# Patient Record
(demographics unavailable — no encounter records)

---

## 2024-12-06 NOTE — REASON FOR VISIT
[Consultation] : a consultation visit [Patient] : patient [Mother] : mother [Procedure: _________] : a [unfilled] procedure visit

## 2024-12-07 NOTE — HISTORY OF PRESENT ILLNESS
[Premenarchal] : premenarchal [FreeTextEntry2] : 9 year 11 months old female who present for f/u of early puberty  Mom noted that at her well visit in May 2024 (9 years 5 months) --> concern for pubic hair and breast development (reports no concerns were brought up to her attention at prior visits) prompting referral to [facility] endocrine  [place] 08/2024  (initial visit)   Review of Initial Presentation at 9 years 7 months  Apocrine odor noted around 10 y/o  Pubic hair in the past 1 year but note sure exactly when started. Unsure about when breast development started - mom states was pointed out to her by PMD at 9 years 5 months but unsure if it was present before  Denied acne  Denied voice changes  Denied vaginal discharge  Denied rapid linear growth No known exogenous exposure to estrogen or testosterone  Does not use Lavender or Tea Tree oil at this time  (used to use Dr. Fonseca's shampoo a few years ago with tea tree oil but has not used it in years) Does not eat soy based products/drink soy milk   Since last visit -Mom noticing a little more pubic hair but no rapid progression  -No significant changes in breast tissue -No vaginal discharge or vaginal spotting/bleeding --Grew 1.5 cm in the past 4 months ---> steady growth along the 40th percentile -Obtained Bone age - similar to chronological age (not advanced ) - see results -Labs ordered but many labs were not ran by lab- see results   Other issues:  Asthma - albuterol PRN  Auditory processing disorder and unilateral decreased hearing (unsure what ear)   Reads slowly - gets speech, PT,   Mother has PCOS; No known FH of CAH

## 2024-12-07 NOTE — ASSESSMENT
[FreeTextEntry1] : Yashira is 9 year 11 months old female who is referred for evaluation of precocious puberty Patient has both breast development and pubic hair as well as axilary hair.   Mom cannot recall exactly when pubic and axillary hair started but believes after 10 y/o  She is unsure about timing of onset of breast development  Review of growth chart from PMD shows linear growth without accelerations and currently she is not growing rapidly.   She is not yet menarchal with no rapid progression of breasts of pubic hair.  In addition, her Bone age is not advanced..  I discussed with mom that given this data, it is unlikely that Yashira's puberty and adrenarche started before 7 y/o (precocious) and likely she has normal timing of puberty and adrenarche.   For completion, however, I did advise early AM BW at initial visit --> patient has normal testo and DHEAS but androstenedione and 17 OHP were not ran.  Estradiol did not run as well.    -For completion advised to obtain above listed labs 7-8 AM  -RTC in 4 months to re-evaluate growth and pubertal progression

## 2024-12-07 NOTE — CONSULT LETTER
[Dear  ___] : Dear  [unfilled], [Consult Letter:] : I had the pleasure of evaluating your patient, [unfilled]. [( Thank you for referring [unfilled] for consultation for _____ )] : Thank you for referring [unfilled] for consultation for [unfilled] [Please see my note below.] : Please see my note below. [Consult Closing:] : Thank you very much for allowing me to participate in the care of this patient.  If you have any questions, please do not hesitate to contact me. [Courtesy Letter:] : I had the pleasure of seeing your patient, [unfilled], in my office today. [FreeTextEntry3] : Sandrine Barnhart MD Pediatric Endocrinology [facility]

## 2024-12-07 NOTE — PHYSICAL EXAM
[Healthy Appearing] : healthy appearing [Well Nourished] : well nourished [Interactive] : interactive [Normal Appearance] : normal appearance [Well formed] : well formed [Normal S1 and S2] : normal S1 and S2 [Clear to Ausculation Bilaterally] : clear to auscultation bilaterally [Abdomen Soft] : soft [Abdomen Tenderness] : non-tender [] : no hepatosplenomegaly [Normal] : normal  [Dysmorphic] : non-dysmorphic [Goiter] : no goiter [Murmur] : no murmurs [de-identified] : wide spaced  teeth  [de-identified] : Breasts- Ortiz 3, PH Ortiz 2, Axillary hair -  mild

## 2024-12-07 NOTE — FAMILY HISTORY
[___ inches] : [unfilled] inches [FreeTextEntry3] : +/-2 SDS [FreeTextEntry5] : 14 y/o [FreeTextEntry4] : MGM: 63'', MGF 64'', does not know height of paternal grandparents  [FreeTextEntry2] : Has 2 sibligns: 19 y/o brother- 69'', 17 y/o brother -63'', Menarche: 14 y/o

## 2024-12-07 NOTE — PHYSICAL EXAM
[Healthy Appearing] : healthy appearing [Well Nourished] : well nourished [Interactive] : interactive [Normal Appearance] : normal appearance [Well formed] : well formed [Normal S1 and S2] : normal S1 and S2 [Clear to Ausculation Bilaterally] : clear to auscultation bilaterally [Abdomen Soft] : soft [Abdomen Tenderness] : non-tender [] : no hepatosplenomegaly [Normal] : normal  [Dysmorphic] : non-dysmorphic [Goiter] : no goiter [Murmur] : no murmurs [de-identified] : wide spaced  teeth  [de-identified] : Breasts- Ortiz 3, PH Ortiz 2, Axillary hair -  mild

## 2024-12-07 NOTE — FAMILY HISTORY
[___ inches] : [unfilled] inches [FreeTextEntry3] : +/-2 SDS [FreeTextEntry5] : 14 y/o [FreeTextEntry4] : MGM: 63'', MGF 64'', does not know height of paternal grandparents  [FreeTextEntry2] : Has 2 sibligns: 19 y/o brother- 69'', 17 y/o brother -63'', Menarche: 12 y/o

## 2024-12-07 NOTE — FAMILY HISTORY
[___ inches] : [unfilled] inches [FreeTextEntry3] : +/-2 SDS [FreeTextEntry5] : 12 y/o [FreeTextEntry4] : MGM: 63'', MGF 64'', does not know height of paternal grandparents  [FreeTextEntry2] : Has 2 sibligns: 17 y/o brother- 69'', 15 y/o brother -63'', Menarche: 12 y/o

## 2024-12-07 NOTE — PHYSICAL EXAM
[Healthy Appearing] : healthy appearing [Well Nourished] : well nourished [Interactive] : interactive [Normal Appearance] : normal appearance [Well formed] : well formed [Normal S1 and S2] : normal S1 and S2 [Clear to Ausculation Bilaterally] : clear to auscultation bilaterally [Abdomen Soft] : soft [Abdomen Tenderness] : non-tender [] : no hepatosplenomegaly [Normal] : normal  [Dysmorphic] : non-dysmorphic [Goiter] : no goiter [Murmur] : no murmurs [de-identified] : wide spaced  teeth  [de-identified] : Breasts- Ortiz 3, PH Ortiz 2, Axillary hair -  mild

## 2024-12-07 NOTE — DATA REVIEWED
[FreeTextEntry1] : Review of Laboratory Evaluation 06/10/2024 IGF1 175 (10 y/o Ortiz 1 , Ortiz 2 )  Tota testo < 1  DHEAS 25 7-10 y/o <81 SHBG 22 ()  CMP: BG 93, mild elevation in ALT 28 (8-24)-high  Lipid panel: , HDL 47, -high, LDL 89 , CBCd - unremarkable  Normal iron studies, low ferritin  CRP <3  Estradiol < 15 (adult assay)  Vitamin B12 532  TSH  3.10 , Ft4 0.9  ESR 6  HgA1C 5.2%   08/15/2024 Testosterone Total MS- 2  17OHP - TNP  Androstenedione TNP:  DHEAS 18 (<81)  LH Peidatric <0.02, FSH 2.08  Estradiol U/S - TNP  Review of imaging 08/2024 Bone age  CA 9 years 9 months , BA 10 y/o as read by radiology I viewed the bone age and agree with the read Using Isrrael-Pinneau tables, her predicted adult height is about 61.5 (appropriate for [facility]) Discussed that height prediction is only based on most recent height and most recent bone age and thus can change depending on the rate of pubertal progression, growth, and bone age advancement  Review of Growth Chart from PMD (points from 1 y/o to 9years 6 months available)  Height: Linear growth along the 30th-40th percentiles without height acceleration (from 07/2023 to 07/2024 grew 5 cm)   Weight: steady weight gain along the 50th-60th percentiles with acceleration to the 70th percentile by 8 years 11 months  BMI:60th to 70th percentiles until accelerated to above the 85th percentile by 8 years 11 months

## 2024-12-07 NOTE — PAST MEDICAL HISTORY
[At Term] : at term [ Section] : by  section [None] : there were no delivery complications [Speech Therapy] : speech therapy [de-identified] : planned C/S repeat  [FreeTextEntry1] : BW 6 lbs 2 oz , BL cannot recall  [FreeTextEntry3] : Mom believes has auditory processing disorder and thus she gets speech threrapy

## 2024-12-07 NOTE — HISTORY OF PRESENT ILLNESS
[Premenarchal] : premenarchal [FreeTextEntry2] : 9 year 11 months old female who present for f/u of early puberty  Mom noted that at her well visit in May 2024 (9 years 5 months) --> concern for pubic hair and breast development (reports no concerns were brought up to her attention at prior visits) prompting referral to [facility] endocrine  [place] 08/2024  (initial visit)   Review of Initial Presentation at 9 years 7 months  Apocrine odor noted around 8 y/o  Pubic hair in the past 1 year but note sure exactly when started. Unsure about when breast development started - mom states was pointed out to her by PMD at 9 years 5 months but unsure if it was present before  Denied acne  Denied voice changes  Denied vaginal discharge  Denied rapid linear growth No known exogenous exposure to estrogen or testosterone  Does not use Lavender or Tea Tree oil at this time  (used to use Dr. Fonseca's shampoo a few years ago with tea tree oil but has not used it in years) Does not eat soy based products/drink soy milk   Since last visit -Mom noticing a little more pubic hair but no rapid progression  -No significant changes in breast tissue -No vaginal discharge or vaginal spotting/bleeding --Grew 1.5 cm in the past 4 months ---> steady growth along the 40th percentile -Obtained Bone age - similar to chronological age (not advanced ) - see results -Labs ordered but many labs were not ran by lab- see results   Other issues:  Asthma - albuterol PRN  Auditory processing disorder and unilateral decreased hearing (unsure what ear)   Reads slowly - gets speech, PT,   Mother has PCOS; No known FH of CAH

## 2024-12-07 NOTE — PAST MEDICAL HISTORY
[At Term] : at term [ Section] : by  section [None] : there were no delivery complications [Speech Therapy] : speech therapy [de-identified] : planned C/S repeat  [FreeTextEntry1] : BW 6 lbs 2 oz , BL cannot recall  [FreeTextEntry3] : Mom believes has auditory processing disorder and thus she gets speech threrapy

## 2024-12-07 NOTE — DATA REVIEWED
[FreeTextEntry1] : Review of Laboratory Evaluation 06/10/2024 IGF1 175 (10 y/o Ortiz 1 , Ortiz 2 )  Tota testo < 1  DHEAS 25 7-10 y/o <81 SHBG 22 ()  CMP: BG 93, mild elevation in ALT 28 (8-24)-high  Lipid panel: , HDL 47, -high, LDL 89 , CBCd - unremarkable  Normal iron studies, low ferritin  CRP <3  Estradiol < 15 (adult assay)  Vitamin B12 532  TSH  3.10 , Ft4 0.9  ESR 6  HgA1C 5.2%   08/15/2024 Testosterone Total MS- 2  17OHP - TNP  Androstenedione TNP:  DHEAS 18 (<81)  LH Peidatric <0.02, FSH 2.08  Estradiol U/S - TNP  Review of imaging 08/2024 Bone age  CA 9 years 9 months , BA 10 y/o as read by radiology I viewed the bone age and agree with the read Using Isrrael-Pinneau tables, her predicted adult height is about 61.5 (appropriate for [facility]) Discussed that height prediction is only based on most recent height and most recent bone age and thus can change depending on the rate of pubertal progression, growth, and bone age advancement  Review of Growth Chart from PMD (points from 1 y/o to 9years 6 months available)  Height: Linear growth along the 30th-40th percentiles without height acceleration (from 07/2023 to 07/2024 grew 5 cm)   Weight: steady weight gain along the 50th-60th percentiles with acceleration to the 70th percentile by 8 years 11 months  BMI:60th to 70th percentiles until accelerated to above the 85th percentile by 8 years 11 months

## 2024-12-07 NOTE — DATA REVIEWED
[FreeTextEntry1] : Review of Laboratory Evaluation 06/10/2024 IGF1 175 (10 y/o Ortiz 1 , Ortiz 2 )  Tota testo < 1  DHEAS 25 7-10 y/o <81 SHBG 22 ()  CMP: BG 93, mild elevation in ALT 28 (8-24)-high  Lipid panel: , HDL 47, -high, LDL 89 , CBCd - unremarkable  Normal iron studies, low ferritin  CRP <3  Estradiol < 15 (adult assay)  Vitamin B12 532  TSH  3.10 , Ft4 0.9  ESR 6  HgA1C 5.2%   08/15/2024 Testosterone Total MS- 2  17OHP - TNP  Androstenedione TNP:  DHEAS 18 (<81)  LH Peidatric <0.02, FSH 2.08  Estradiol U/S - TNP  Review of imaging 08/2024 Bone age  CA 9 years 9 months , BA 10 y/o as read by radiology I viewed the bone age and agree with the read Using Isrrael-Pinneau tables, her predicted adult height is about 61.5 (appropriate for [facility]) Discussed that height prediction is only based on most recent height and most recent bone age and thus can change depending on the rate of pubertal progression, growth, and bone age advancement  Review of Growth Chart from PMD (points from 1 y/o to 9years 6 months available)  Height: Linear growth along the 30th-40th percentiles without height acceleration (from 07/2023 to 07/2024 grew 5 cm)   Weight: steady weight gain along the 50th-60th percentiles with acceleration to the 70th percentile by 8 years 11 months  BMI:60th to 70th percentiles until accelerated to above the 85th percentile by 8 years 11 months

## 2024-12-07 NOTE — HISTORY OF PRESENT ILLNESS
[Premenarchal] : premenarchal [FreeTextEntry2] : 9 year 11 months old female who present for f/u of early puberty  Mom noted that at her well visit in May 2024 (9 years 5 months) --> concern for pubic hair and breast development (reports no concerns were brought up to her attention at prior visits) prompting referral to [facility] endocrine  [place] 08/2024  (initial visit)   Review of Initial Presentation at 9 years 7 months  Apocrine odor noted around 8 y/o  Pubic hair in the past 1 year but note sure exactly when started. Unsure about when breast development started - mom states was pointed out to her by PMD at 9 years 5 months but unsure if it was present before  Denied acne  Denied voice changes  Denied vaginal discharge  Denied rapid linear growth No known exogenous exposure to estrogen or testosterone  Does not use Lavender or Tea Tree oil at this time  (used to use Dr. Fonseca's shampoo a few years ago with tea tree oil but has not used it in years) Does not eat soy based products/drink soy milk   Since last visit -Mom noticing a little more pubic hair but no rapid progression  -No significant changes in breast tissue -No vaginal discharge or vaginal spotting/bleeding --Grew 1.5 cm in the past 4 months ---> steady growth along the 40th percentile -Obtained Bone age - similar to chronological age (not advanced ) - see results -Labs ordered but many labs were not ran by lab- see results   Other issues:  Asthma - albuterol PRN  Auditory processing disorder and unilateral decreased hearing (unsure what ear)   Reads slowly - gets speech, PT,   Mother has PCOS; No known FH of CAH

## 2024-12-07 NOTE — PAST MEDICAL HISTORY
[At Term] : at term [ Section] : by  section [None] : there were no delivery complications [Speech Therapy] : speech therapy [de-identified] : planned C/S repeat  [FreeTextEntry1] : BW 6 lbs 2 oz , BL cannot recall  [FreeTextEntry3] : Mom believes has auditory processing disorder and thus she gets speech threrapy

## 2024-12-07 NOTE — CONSULT LETTER
[Dear  ___] : Dear  [unfilled], [Consult Letter:] : I had the pleasure of evaluating your patient, [unfilled]. [( Thank you for referring [unfilled] for consultation for _____ )] : Thank you for referring [unfilled] for consultation for [unfilled] [Please see my note below.] : Please see my note below. [Consult Closing:] : Thank you very much for allowing me to participate in the care of this patient.  If you have any questions, please do not hesitate to contact me. [Courtesy Letter:] : I had the pleasure of seeing your patient, [unfilled], in my office today. [FreeTextEntry3] : Sandrine Barnhart MD Pediatric Endocrinology [facility]

## 2024-12-07 NOTE — ASSESSMENT
[FreeTextEntry1] : Yashira is 9 year 11 months old female who is referred for evaluation of precocious puberty Patient has both breast development and pubic hair as well as axilary hair.   Mom cannot recall exactly when pubic and axillary hair started but believes after 8 y/o  She is unsure about timing of onset of breast development  Review of growth chart from PMD shows linear growth without accelerations and currently she is not growing rapidly.   She is not yet menarchal with no rapid progression of breasts of pubic hair.  In addition, her Bone age is not advanced..  I discussed with mom that given this data, it is unlikely that Yashira's puberty and adrenarche started before 7 y/o (precocious) and likely she has normal timing of puberty and adrenarche.   For completion, however, I did advise early AM BW at initial visit --> patient has normal testo and DHEAS but androstenedione and 17 OHP were not ran.  Estradiol did not run as well.    -For completion advised to obtain above listed labs 7-8 AM  -RTC in 4 months to re-evaluate growth and pubertal progression

## 2024-12-07 NOTE — ASSESSMENT
[FreeTextEntry1] : Yashira is 9 year 11 months old female who is referred for evaluation of precocious puberty Patient has both breast development and pubic hair as well as axilary hair.   Mom cannot recall exactly when pubic and axillary hair started but believes after 8 y/o  She is unsure about timing of onset of breast development  Review of growth chart from PMD shows linear growth without accelerations and currently she is not growing rapidly.   She is not yet menarchal with no rapid progression of breasts of pubic hair.  In addition, her Bone age is not advanced..  I discussed with mom that given this data, it is unlikely that Yashira's puberty and adrenarche started before 9 y/o (precocious) and likely she has normal timing of puberty and adrenarche.   For completion, however, I did advise early AM BW at initial visit --> patient has normal testo and DHEAS but androstenedione and 17 OHP were not ran.  Estradiol did not run as well.    -For completion advised to obtain above listed labs 7-8 AM  -RTC in 4 months to re-evaluate growth and pubertal progression

## 2024-12-07 NOTE — CONSULT LETTER
[Dear  ___] : Dear  [unfilled], [Consult Letter:] : I had the pleasure of evaluating your patient, [unfilled]. [( Thank you for referring [unfilled] for consultation for _____ )] : Thank you for referring [unfilled] for consultation for [unfilled] [Please see my note below.] : Please see my note below. [Consult Closing:] : Thank you very much for allowing me to participate in the care of this patient.  If you have any questions, please do not hesitate to contact me. [Courtesy Letter:] : I had the pleasure of seeing your patient, [unfilled], in my office today. [FreeTextEntry3] : Sandrine Barnhart MD Pediatric Endocrinology [facility]

## 2024-12-07 NOTE — REVIEW OF SYSTEMS
[Nl] : Neurological [Pubertal Concerns] : pubertal concerns [Abdominal Pain] : no abdominal pain [Headache] : no headache [Cold Intolerance] : no intolerance to cold [Heat Intolerance] : no intolerance to heat [Polydypsia] : no polydipsia [Polyuria] : no polyuria [FreeTextEntry2] : Auditory processing disorder

## 2025-04-23 NOTE — DATA REVIEWED
[FreeTextEntry1] : Review of Laboratory Evaluation 06/10/2024 IGF1 175 (10 y/o Ortiz 1 , Ortiz 2 )  Tota testo < 1  DHEAS 25 7-10 y/o <81 SHBG 22 ()  CMP: BG 93, mild elevation in ALT 28 (8-24)-high  Lipid panel: , HDL 47, -high, LDL 89 , CBCd - unremarkable  Normal iron studies, low ferritin  CRP <3  Estradiol < 15 (adult assay)  Vitamin B12 532  TSH  3.10 , Ft4 0.9  ESR 6  HgA1C 5.2%   08/15/2024 Testosterone Total MS- 2  17OHP - TNP  Androstenedione TNP:  DHEAS 18 (<81)  LH Peidatric <0.02, FSH 2.08  Estradiol U/S - TNP  01/22/2025 [facility]; 7:39 AM  LH 0.009,  FSH 1.9 , Estradiol LCMS: < 1  Androsteron (not ordered rather ordered androstenedione) < 5 DHEAS 61 (0-185) Testosterone < 3 17OHP 14 ()   Review of imaging 08/2024 Bone age  CA 9 years 9 months , BA 10 y/o as read by radiology I viewed the bone age and agree with the read Using Isrrael-Pinneau tables, her predicted adult height is about 61.5 (appropriate for [facility]) Discussed that height prediction is only based on most recent height and most recent bone age and thus can change depending on the rate of pubertal progression, growth, and bone age advancement  Review of Growth Chart from PMD (points from 3 y/o to 9years 6 months available)  Height: Linear growth along the 30th-40th percentiles without height acceleration (from 07/2023 to 07/2024 grew 5 cm)   Weight: steady weight gain along the 50th-60th percentiles with acceleration to the 70th percentile by 8 years 11 months  BMI:60th to 70th percentiles until accelerated to above the 85th percentile by 8 years 11 months

## 2025-04-23 NOTE — CONSULT LETTER
[Dear  ___] : Dear  [unfilled], [Courtesy Letter:] : I had the pleasure of seeing your patient, [unfilled], in my office today. [Please see my note below.] : Please see my note below. [Consult Closing:] : Thank you very much for allowing me to participate in the care of this patient.  If you have any questions, please do not hesitate to contact me. [FreeTextEntry3] : Sandrine Barnhart MD Pediatric Endocrinology [facility]

## 2025-04-23 NOTE — HISTORY OF PRESENT ILLNESS
[Premenarchal] : premenarchal [FreeTextEntry2] : 10 year 4 months old female who present for f/u of early puberty  Mom noted that at her well visit in May 2024 (9 years 5 months) --> concern for pubic hair and breast development (reports no concerns were brought up to her attention at prior visits) prompting referral to Peds endocrine  Last visit:12/2024  Review of Initial Presentation at 9 years 7 months  Apocrine odor noted around 10 y/o  Pubic hair in the past 1 year but note sure exactly when started. Unsure about when breast development started - mom states was pointed out to her by PMD at 9 years 5 months but unsure if it was present before  Denied acne  Denied voice changes  Denied vaginal discharge  Denied rapid linear growth No known exogenous exposure to estrogen or testosterone  Does not use Lavender or Tea Tree oil at this time  (used to use Dr. Fonseca's shampoo a few years ago with tea tree oil but has not used it in years) Does not eat soy based products/drink soy milk   Since last visit -Mom states she doesn't know if Yashira is having more pubic hair as "she doesn't look" -No significant changes in breast tissue -No vaginal discharge or vaginal spotting/bleeding -Denies menarche --Grew 2.5 cm in the past 4.5 months  (AGV 6.6 cm/year)---> steady growth along the 40th percentile -Gained 8 lbs from last visit  Eats fruits and vegetables Has about 2 snacks per day (chips, pretzles)  Drinks 2 cups of juice for Shabbath - otherwise mostly water No regular physical activity   Other issues:  Asthma - albuterol PRN  Auditory processing disorder and unilateral decreased hearing (unsure what ear)   Reads slowly - gets speech, PT,   Mother has PCOS; No known FH of CAH

## 2025-04-23 NOTE — ASSESSMENT
[FreeTextEntry1] : Yashira is 10 year 4 months old female who is referred for evaluation of precocious puberty Patient has both breast development and pubic hair as well as axilary hair.   Mom cannot recall exactly when pubic and axillary hair started but believes after 8 y/o  She is unsure about timing of onset of breast development  Review of growth chart from PMD shows linear growth without accelerations and currently she is not growing rapidly.   She is not yet menarchal with no rapid progression of breasts of pubic hair.   In addition, her Bone age was not significant advanced (08/2024)   She has grown 2.5 cm in the past 4.5 months --> AGV 6.6 cm/year  She has also gained 8 lbs  I discussed with mom that given this data, it is unlikely that Yashira's puberty and adrenarche started before 9 y/o (precocious) and likely she has normal timing of puberty and adrenarche.   For completion, however, I did advise early AM BW at initial visit --> patient has normal testo and DHEAS but androstenedione and 17 OHP were not ran.  Estradiol was not run as well.   Repeated early morning testing in 01/2025--> normal 17 OHP, DHEA, testo, gonadotropins and estradiol are actually pre-pubertal (not catching pubertal hormones on early morning testing? vs. not actually in central puberty and "breast tissue" palpated on exam is adipose tissue.)  Today we discussed these results in detail as well and I explained to mom that they do not point to pathology   -RTC in 6 months for height re-evaluation   -Discussed healthy eating - reduction of simple carbs and regular physical activity

## 2025-04-23 NOTE — PAST MEDICAL HISTORY
[At Term] : at term [ Section] : by  section [None] : there were no delivery complications [Speech Therapy] : speech therapy [de-identified] : planned C/S repeat  [FreeTextEntry1] : BW 6 lbs 2 oz , BL cannot recall  [FreeTextEntry3] : Mom believes has auditory processing disorder and thus she gets speech threrapy